# Patient Record
Sex: MALE | Race: WHITE | NOT HISPANIC OR LATINO | Employment: FULL TIME | URBAN - METROPOLITAN AREA
[De-identification: names, ages, dates, MRNs, and addresses within clinical notes are randomized per-mention and may not be internally consistent; named-entity substitution may affect disease eponyms.]

---

## 2017-03-09 RX ORDER — ATORVASTATIN CALCIUM 40 MG/1
TABLET, FILM COATED ORAL
Qty: 30 TABLET | Refills: 0 | Status: SHIPPED | OUTPATIENT
Start: 2017-03-09 | End: 2017-06-07 | Stop reason: SDUPTHER

## 2017-03-09 RX ORDER — ATENOLOL 25 MG/1
TABLET ORAL
Qty: 30 TABLET | Refills: 5 | Status: SHIPPED | OUTPATIENT
Start: 2017-03-09 | End: 2017-04-03 | Stop reason: SDUPTHER

## 2017-03-24 RX ORDER — LISINOPRIL 5 MG/1
5 TABLET ORAL DAILY
Qty: 90 TABLET | Refills: 0 | Status: SHIPPED | OUTPATIENT
Start: 2017-03-24 | End: 2017-06-23 | Stop reason: SDUPTHER

## 2017-03-29 RX ORDER — ISOSORBIDE MONONITRATE 30 MG/1
TABLET, EXTENDED RELEASE ORAL
Qty: 30 TABLET | Refills: 2 | Status: SHIPPED | OUTPATIENT
Start: 2017-03-29 | End: 2017-06-26 | Stop reason: SDUPTHER

## 2017-04-03 RX ORDER — ATENOLOL 25 MG/1
25 TABLET ORAL DAILY
Qty: 90 TABLET | Refills: 1 | Status: SHIPPED | OUTPATIENT
Start: 2017-04-03 | End: 2018-04-03 | Stop reason: SDUPTHER

## 2017-06-02 ENCOUNTER — TELEPHONE (OUTPATIENT)
Dept: CARDIOLOGY | Facility: CLINIC | Age: 57
End: 2017-06-02

## 2017-06-02 NOTE — TELEPHONE ENCOUNTER
I have called and left several message with PCP to fax over his lipids that has been done so we can refill his chol medications. I spoke to the pt girlfriend and she is going to call PCP to see if she can get a hold of them so they can fax over labs. After we obtain the labs we can then refill his atorvastatin.

## 2017-06-06 RX ORDER — ATORVASTATIN CALCIUM 40 MG/1
TABLET, FILM COATED ORAL
Qty: 30 TABLET | Refills: 0 | Status: CANCELLED | OUTPATIENT
Start: 2017-06-06

## 2017-06-06 NOTE — TELEPHONE ENCOUNTER
Obtain labs will show JA tomorrow when he returns. I will place his labs on your desk tomorrow to review. Is it okay to refill atorvastatin? Please Advise    Thanks Allie

## 2017-06-07 RX ORDER — ATORVASTATIN CALCIUM 40 MG/1
TABLET, FILM COATED ORAL
Qty: 30 TABLET | Refills: 10 | Status: SHIPPED | OUTPATIENT
Start: 2017-06-07 | End: 2018-05-12 | Stop reason: SDUPTHER

## 2017-06-21 ENCOUNTER — OFFICE VISIT (OUTPATIENT)
Dept: CARDIOLOGY | Facility: CLINIC | Age: 57
End: 2017-06-21

## 2017-06-21 VITALS
HEIGHT: 70 IN | HEART RATE: 55 BPM | BODY MASS INDEX: 36.22 KG/M2 | SYSTOLIC BLOOD PRESSURE: 126 MMHG | WEIGHT: 253 LBS | DIASTOLIC BLOOD PRESSURE: 80 MMHG

## 2017-06-21 DIAGNOSIS — I21.4 NSTEMI (NON-ST ELEVATED MYOCARDIAL INFARCTION) (HCC): ICD-10-CM

## 2017-06-21 DIAGNOSIS — Z72.0 TOBACCO ABUSE: ICD-10-CM

## 2017-06-21 DIAGNOSIS — I25.10 CORONARY ARTERY DISEASE INVOLVING NATIVE CORONARY ARTERY OF NATIVE HEART WITHOUT ANGINA PECTORIS: Primary | ICD-10-CM

## 2017-06-21 DIAGNOSIS — I10 ESSENTIAL HYPERTENSION: ICD-10-CM

## 2017-06-21 PROCEDURE — 93000 ELECTROCARDIOGRAM COMPLETE: CPT | Performed by: INTERNAL MEDICINE

## 2017-06-21 PROCEDURE — 99214 OFFICE O/P EST MOD 30 MIN: CPT | Performed by: INTERNAL MEDICINE

## 2017-06-21 NOTE — PROGRESS NOTES
Subjective:     Encounter Date:      Patient ID: Stephan Husain is a 56 y.o. male.    Chief Complaint:  History of Present Illness    Dear Dr. Taylor,    Had the pleasure of seeing this patient in the office today.  He comes in for routine follow-up of his cardiac status.  It is been one year since his last visit.    This patient has a history of a non-ST segment elevation myocardial infarction.  Prior cardiac catheterization demonstrated complete occlusion of a previously placed stent in his right coronary artery.  However he had significant collaterals that had developed.  His heart catheterization demonstrated inferior akinesis, ejection 45%, normal left main, 10% stenosis in the proximal LAD, circumflex widely patent, and the right coronary artery with a 90% mid and then 100% in-stent stenosis distal in the RCA.  Medical therapy was recommended.    His main complaint is that he is been very tired.  By the time he gets home from work he doesn't have any energy.  He has not been doing any exercise at all.  He's gained about 10 pounds since his visit here ago.    Patient unfortunately is not completely stopped smoking.  He continues to smoke one pack of cigarettes a day.  He would like to stop but has not been successful.    He denies any cardiac complaints.This patient denies any chest pain, pressure, tightness, squeezing, or heartburn.  This patient has not experienced any feeling of palpitations, tachycardia or heart racing and no presyncope or syncope.  There has not been any problems with dizziness or lightheadedness.  There has not been any orthopnea or PND, and no problems with lower extremity edema.  This patient denies any shortness of breath at rest or with activity and has not had any wheezing.  This patient has not had any problems with unexplained nausea or vomiting. The patient has continued to perform daily activities of living without any specific problem or change in the level of activity.   This patient has not been recently hospitalized for any reason.      The following portions of the patient's history were reviewed and updated as appropriate: allergies, current medications, past family history, past medical history, past social history, past surgical history and problem list.    Past Medical History:   Diagnosis Date   • CAD (coronary artery disease)    • Hypertension    • NSTEMI (non-ST elevated myocardial infarction)        Past Surgical History:   Procedure Laterality Date   • CARDIAC CATHETERIZATION     • CORONARY STENT PLACEMENT         Social History     Social History   • Marital status: Single     Spouse name: N/A   • Number of children: N/A   • Years of education: N/A     Occupational History   • Not on file.     Social History Main Topics   • Smoking status: Current Every Day Smoker   • Smokeless tobacco: Not on file   • Alcohol use Not on file   • Drug use: Not on file   • Sexual activity: Not on file     Other Topics Concern   • Not on file     Social History Narrative       Review of Systems   Constitution: Negative for chills, decreased appetite, fever and night sweats.   HENT: Negative for ear discharge, ear pain, hearing loss, nosebleeds and sore throat.    Eyes: Negative for blurred vision, double vision and pain.   Cardiovascular: Negative for cyanosis.   Respiratory: Negative for hemoptysis and sputum production.    Endocrine: Negative for cold intolerance and heat intolerance.   Hematologic/Lymphatic: Negative for adenopathy.   Skin: Negative for dry skin, itching, nail changes, rash and suspicious lesions.   Musculoskeletal: Negative for arthritis, gout, muscle cramps, muscle weakness, myalgias and neck pain.   Gastrointestinal: Negative for anorexia, bowel incontinence, constipation, diarrhea, dysphagia, hematemesis and jaundice.   Genitourinary: Negative for bladder incontinence, dysuria, flank pain, frequency, hematuria and nocturia.   Neurological: Negative for focal  "weakness, numbness, paresthesias and seizures.   Psychiatric/Behavioral: Negative for altered mental status, hallucinations, hypervigilance, suicidal ideas and thoughts of violence.   Allergic/Immunologic: Negative for persistent infections.         ECG 12 Lead  Date/Time: 6/21/2017 11:16 AM  Performed by: KENTRELL CHAVARRIA III.  Authorized by: KENTRELL CHAVARRIA III   Comparison: compared with previous ECG   Similar to previous ECG  Rhythm: sinus rhythm  Rate: normal  Conduction: conduction normal  ST Segments: ST segments normal  T Waves: T waves normal  QRS axis: normal  Other: no other findings  Q waves: II, III and aVF  Clinical impression: abnormal ECG               Objective:     Vitals:    06/21/17 0953   BP: 126/80   Pulse: 55   Weight: 253 lb (115 kg)   Height: 70\" (177.8 cm)         Physical Exam   Constitutional: He is oriented to person, place, and time. He appears well-developed and well-nourished. No distress.   HENT:   Head: Normocephalic and atraumatic.   Nose: Nose normal.   Mouth/Throat: Oropharynx is clear and moist.   Eyes: Conjunctivae and EOM are normal. Pupils are equal, round, and reactive to light. Right eye exhibits no discharge. Left eye exhibits no discharge.   Neck: Normal range of motion. Neck supple. No tracheal deviation present. No thyromegaly present.   Cardiovascular: Normal rate, regular rhythm, S1 normal, S2 normal, normal heart sounds and normal pulses.  Exam reveals no S3.    Pulmonary/Chest: Effort normal and breath sounds normal. No stridor. No respiratory distress. He exhibits no tenderness.   Abdominal: Soft. Bowel sounds are normal. He exhibits no distension and no mass. There is no tenderness. There is no rebound and no guarding.   Musculoskeletal: Normal range of motion. He exhibits no tenderness or deformity.   Lymphadenopathy:     He has no cervical adenopathy.   Neurological: He is alert and oriented to person, place, and time. He has normal reflexes.   Skin: Skin is warm " and dry. No rash noted. He is not diaphoretic. No erythema.   Psychiatric: He has a normal mood and affect. Thought content normal.       Lab Review:                 Assessment:          Diagnosis Plan   1. Coronary artery disease involving native coronary artery of native heart without angina pectoris  ECG 12 Lead   2. NSTEMI (non-ST elevated myocardial infarction)  ECG 12 Lead   3. Essential hypertension     4. Tobacco abuse            Plan:       1. Coronary Artery Disease  Assessment  • The patient has no angina    Plan  • Lifestyle modifications discussed include adhering to a heart healthy diet, avoidance of tobacco products, maintenance of a healthy weight, medication compliance, regular exercise and regular monitoring of cholesterol and blood pressure  • We specifically spent a lot of time going over exercise recommendations.  Additionally, reviewed strategies for smoking cessation.    Subjective - Objective  • There is a history of past MI  • There has been a previous stent procedure using MARCY  • Current antiplatelet therapy includes aspirin 81 mg    2.  Tobacco abuse-work on smoking cessation  3.  Hypertension-under good control    Thank you very much for allowing us to participate in the care of this pleasant patient.  Please don't hesitate to call if I can be of assistance in any way.      Current Outpatient Prescriptions:   •  aspirin tablet, Take 81 mg by mouth., Disp: , Rfl:   •  atenolol (TENORMIN) 25 MG tablet, Take 1 tablet by mouth Daily., Disp: 90 tablet, Rfl: 1  •  atorvastatin (LIPITOR) 40 MG tablet, Take 1 tablet at bedtime, Disp: 30 tablet, Rfl: 10  •  isosorbide mononitrate (IMDUR) 30 MG 24 hr tablet, TAKE 1 TABLET EVERY DAY, Disp: 30 tablet, Rfl: 2  •  lisinopril (PRINIVIL,ZESTRIL) 5 MG tablet, Take 1 tablet by mouth Daily., Disp: 90 tablet, Rfl: 0  •  Multiple Vitamin (MULTIVITAMIN) capsule, Take  by mouth., Disp: , Rfl:   •  Tiotropium Bromide Monohydrate (SPIRIVA HANDIHALER IN), Inhale.,  Disp: , Rfl:

## 2017-06-23 RX ORDER — LISINOPRIL 5 MG/1
TABLET ORAL
Qty: 90 TABLET | Refills: 2 | Status: SHIPPED | OUTPATIENT
Start: 2017-06-23 | End: 2018-05-02 | Stop reason: SDUPTHER

## 2017-06-26 RX ORDER — ISOSORBIDE MONONITRATE 30 MG/1
TABLET, EXTENDED RELEASE ORAL
Qty: 30 TABLET | Refills: 10 | Status: SHIPPED | OUTPATIENT
Start: 2017-06-26 | End: 2018-04-21 | Stop reason: SDUPTHER

## 2018-02-20 ENCOUNTER — TELEPHONE (OUTPATIENT)
Dept: CARDIOLOGY | Facility: CLINIC | Age: 58
End: 2018-02-20

## 2018-04-03 RX ORDER — ATENOLOL 25 MG/1
25 TABLET ORAL DAILY
Qty: 90 TABLET | Refills: 0 | Status: SHIPPED | OUTPATIENT
Start: 2018-04-03 | End: 2018-06-26 | Stop reason: SDUPTHER

## 2018-04-23 RX ORDER — ISOSORBIDE MONONITRATE 30 MG/1
30 TABLET, EXTENDED RELEASE ORAL DAILY
Qty: 30 TABLET | Refills: 1 | Status: SHIPPED | OUTPATIENT
Start: 2018-04-23 | End: 2018-06-26 | Stop reason: SDUPTHER

## 2018-05-02 RX ORDER — LISINOPRIL 5 MG/1
TABLET ORAL
Qty: 90 TABLET | Refills: 0 | Status: SHIPPED | OUTPATIENT
Start: 2018-05-02 | End: 2018-07-18 | Stop reason: SDUPTHER

## 2018-05-14 RX ORDER — ATORVASTATIN CALCIUM 40 MG/1
TABLET, FILM COATED ORAL
Qty: 30 TABLET | Refills: 10 | Status: SHIPPED | OUTPATIENT
Start: 2018-05-14 | End: 2019-02-14 | Stop reason: SDUPTHER

## 2018-05-14 NOTE — TELEPHONE ENCOUNTER
LMOM for pt to call w PCP name and if they had recent labs done.  [facility]

## 2018-05-14 NOTE — TELEPHONE ENCOUNTER
Rafael fournier we have scanned labs from 2/2017; I bet that he has labs more recently. Please check with his primary MD

## 2018-05-14 NOTE — TELEPHONE ENCOUNTER
I spoke to michelle and she will fax over his most recent lipids that was done to our office at [phone]. 1/2018

## 2018-06-26 RX ORDER — ISOSORBIDE MONONITRATE 30 MG/1
30 TABLET, EXTENDED RELEASE ORAL DAILY
Qty: 90 TABLET | Refills: 0 | Status: SHIPPED | OUTPATIENT
Start: 2018-06-26 | End: 2018-07-18 | Stop reason: SDUPTHER

## 2018-06-26 RX ORDER — ATENOLOL 25 MG/1
25 TABLET ORAL DAILY
Qty: 90 TABLET | Refills: 0 | Status: SHIPPED | OUTPATIENT
Start: 2018-06-26 | End: 2018-09-22 | Stop reason: SDUPTHER

## 2018-07-05 RX ORDER — ISOSORBIDE MONONITRATE 30 MG/1
TABLET, EXTENDED RELEASE ORAL
Qty: 90 TABLET | Refills: 0 | OUTPATIENT
Start: 2018-07-05

## 2018-07-05 RX ORDER — LISINOPRIL 5 MG/1
TABLET ORAL
Qty: 90 TABLET | Refills: 0 | OUTPATIENT
Start: 2018-07-05

## 2018-07-18 RX ORDER — ISOSORBIDE MONONITRATE 30 MG/1
30 TABLET, EXTENDED RELEASE ORAL DAILY
Qty: 90 TABLET | Refills: 0 | Status: SHIPPED | OUTPATIENT
Start: 2018-07-18 | End: 2018-12-04 | Stop reason: SDUPTHER

## 2018-07-18 RX ORDER — LISINOPRIL 5 MG/1
5 TABLET ORAL DAILY
Qty: 90 TABLET | Refills: 0 | Status: SHIPPED | OUTPATIENT
Start: 2018-07-18 | End: 2018-10-24 | Stop reason: SDUPTHER

## 2018-09-24 RX ORDER — ATENOLOL 25 MG/1
25 TABLET ORAL DAILY
Qty: 90 TABLET | Refills: 1 | Status: SHIPPED | OUTPATIENT
Start: 2018-09-24 | End: 2019-02-22 | Stop reason: SDUPTHER

## 2018-10-04 ENCOUNTER — OFFICE VISIT (OUTPATIENT)
Dept: CARDIOLOGY | Facility: CLINIC | Age: 58
End: 2018-10-04

## 2018-10-04 VITALS
BODY MASS INDEX: 34.86 KG/M2 | HEART RATE: 70 BPM | WEIGHT: 249 LBS | HEIGHT: 71 IN | DIASTOLIC BLOOD PRESSURE: 76 MMHG | SYSTOLIC BLOOD PRESSURE: 120 MMHG

## 2018-10-04 DIAGNOSIS — Z72.0 TOBACCO ABUSE: ICD-10-CM

## 2018-10-04 DIAGNOSIS — G47.33 OSA (OBSTRUCTIVE SLEEP APNEA): ICD-10-CM

## 2018-10-04 DIAGNOSIS — I10 ESSENTIAL HYPERTENSION: ICD-10-CM

## 2018-10-04 DIAGNOSIS — I21.4 NSTEMI (NON-ST ELEVATED MYOCARDIAL INFARCTION) (HCC): ICD-10-CM

## 2018-10-04 DIAGNOSIS — I25.10 CORONARY ARTERY DISEASE INVOLVING NATIVE CORONARY ARTERY OF NATIVE HEART WITHOUT ANGINA PECTORIS: Primary | ICD-10-CM

## 2018-10-04 PROCEDURE — 99214 OFFICE O/P EST MOD 30 MIN: CPT | Performed by: INTERNAL MEDICINE

## 2018-10-04 PROCEDURE — 93000 ELECTROCARDIOGRAM COMPLETE: CPT | Performed by: INTERNAL MEDICINE

## 2018-10-04 NOTE — PROGRESS NOTES
Subjective:     Encounter Date: 10/4/2018      Patient ID: Stephan Husain is a 58 y.o. male.    Chief Complaint: CAD  History of Present Illness      Had the pleasure of seeing this patient in the office today.  He comes in for routine follow-up of his cardiac status.  It is been one year since his last visit.    This patient has a history of a non-ST segment elevation myocardial infarction.  Prior cardiac catheterization demonstrated complete occlusion of a previously placed stent in his right coronary artery.  However he had significant collaterals that had developed.  His heart catheterization demonstrated inferior akinesis, ejection 45%, normal left main, 10% stenosis in the proximal LAD, circumflex widely patent, and the right coronary artery with a 90% mid and then 100% in-stent stenosis distal in the RCA.  Medical therapy was recommended.    He is been very tired and very fatigued.  His wife says that now he seems to stop breathing sometimes at night and he will wake up and feel like he's having to work harder to breathe.  No orthopnea.  No lower extremity edema.  He has not had any chest pain or chest discomfort.  Weight is not really altered over the last year.  He denies any palpitations or heart racing.  No presyncope or syncope.  He's had no other significant medical illnesses.      The following portions of the patient's history were reviewed and updated as appropriate: allergies, current medications, past family history, past medical history, past social history, past surgical history and problem list.    Past Medical History:   Diagnosis Date   • CAD (coronary artery disease)    • Hypertension    • NSTEMI (non-ST elevated myocardial infarction) (CMS/[facility])        Past Surgical History:   Procedure Laterality Date   • CARDIAC CATHETERIZATION     • CORONARY STENT PLACEMENT         Social History     Social History   • Marital status: Single     Spouse name: N/A   • Number of children: N/A   • Years  of education: N/A     Occupational History   • Not on file.     Social History Main Topics   • Smoking status: Current Every Day Smoker   • Smokeless tobacco: Not on file   • Alcohol use Yes      Comment: OCC   • Drug use: Unknown   • Sexual activity: Not on file     Other Topics Concern   • Not on file     Social History Narrative   • No narrative on file       Review of Systems   Constitution: Negative for chills, decreased appetite, fever and night sweats.   HENT: Negative for ear discharge, ear pain, hearing loss, nosebleeds and sore throat.    Eyes: Negative for blurred vision, double vision and pain.   Cardiovascular: Negative for cyanosis.   Respiratory: Negative for hemoptysis and sputum production.    Endocrine: Negative for cold intolerance and heat intolerance.   Hematologic/Lymphatic: Negative for adenopathy.   Skin: Negative for dry skin, itching, nail changes, rash and suspicious lesions.   Musculoskeletal: Negative for arthritis, gout, muscle cramps, muscle weakness, myalgias and neck pain.   Gastrointestinal: Negative for anorexia, bowel incontinence, constipation, diarrhea, dysphagia, hematemesis and jaundice.   Genitourinary: Negative for bladder incontinence, dysuria, flank pain, frequency, hematuria and nocturia.   Neurological: Negative for focal weakness, numbness, paresthesias and seizures.   Psychiatric/Behavioral: Negative for altered mental status, hallucinations, hypervigilance, suicidal ideas and thoughts of violence.   Allergic/Immunologic: Negative for persistent infections.         ECG 12 Lead  Date/Time: 10/4/2018 4:42 PM  Performed by: KENTRELL CHAVARRIA III.  Authorized by: KENTRELL CHAVARRIA III   Comparison: compared with previous ECG   Similar to previous ECG  Rhythm: sinus rhythm  Rate: normal  Conduction: conduction normal  ST Segments: ST segments normal  T Waves: T waves normal  QRS axis: normal  Other: no other findings  Q waves: V4, V5, V6, III and aVF  Clinical impression:  "abnormal ECG               Objective:     Vitals:    10/04/18 1341   BP: 120/76   Pulse: 70   Weight: 113 kg (249 lb)   Height: 180.3 cm (71\")         Physical Exam   Constitutional: He is oriented to person, place, and time. He appears well-developed and well-nourished. No distress.   HENT:   Head: Normocephalic and atraumatic.   Nose: Nose normal.   Mouth/Throat: Oropharynx is clear and moist.   Eyes: Pupils are equal, round, and reactive to light. Conjunctivae and EOM are normal. Right eye exhibits no discharge. Left eye exhibits no discharge.   Neck: Normal range of motion. Neck supple. No tracheal deviation present. No thyromegaly present.   Cardiovascular: Normal rate, regular rhythm, S1 normal, S2 normal, normal heart sounds and normal pulses.  Exam reveals no S3.    Pulmonary/Chest: Effort normal and breath sounds normal. No stridor. No respiratory distress. He exhibits no tenderness.   Abdominal: Soft. Bowel sounds are normal. He exhibits no distension and no mass. There is no tenderness. There is no rebound and no guarding.   Musculoskeletal: Normal range of motion. He exhibits no tenderness or deformity.   Lymphadenopathy:     He has no cervical adenopathy.   Neurological: He is alert and oriented to person, place, and time. He has normal reflexes.   Skin: Skin is warm and dry. No rash noted. He is not diaphoretic. No erythema.   Psychiatric: He has a normal mood and affect. Thought content normal.       Lab Review:                 Assessment:          Diagnosis Plan   1. Coronary artery disease involving native coronary artery of native heart without angina pectoris  Ambulatory Referral to Sleep Lab    ECG 12 Lead   2. Essential hypertension  ECG 12 Lead   3. NSTEMI (non-ST elevated myocardial infarction) (CMS/[facility])  ECG 12 Lead   4. Tobacco abuse     5. SESAR (obstructive sleep apnea)  Ambulatory Referral to Sleep Lab          Plan:       1. Coronary Artery Disease  Assessment  • The patient has no " angina    Plan  • Lifestyle modifications discussed include adhering to a heart healthy diet, avoidance of tobacco products, maintenance of a healthy weight, medication compliance, regular exercise and regular monitoring of cholesterol and blood pressure  • We specifically spent a lot of time going over exercise recommendations.  Additionally, reviewed strategies for smoking cessation.    Subjective - Objective  • There is a history of past MI  • There has been a previous stent procedure using MARCY  • Current antiplatelet therapy includes aspirin 81 mg    2.  Tobacco abuse-work on smoking cessation  3.  Hypertension-under good control on 4.  I'm concerned about potential sleep apnea, we will arrange for a sleep study to be performed    Thank you very much for allowing us to participate in the care of this pleasant patient.  Please don't hesitate to call if I can be of assistance in any way.      Current Outpatient Prescriptions:   •  aspirin tablet, Take 81 mg by mouth., Disp: , Rfl:   •  atenolol (TENORMIN) 25 MG tablet, TAKE 1 TABLET BY MOUTH DAILY., Disp: 90 tablet, Rfl: 1  •  atorvastatin (LIPITOR) 40 MG tablet, TAKE 1 TABLET AT BEDTIME, Disp: 30 tablet, Rfl: 10  •  isosorbide mononitrate (IMDUR) 30 MG 24 hr tablet, Take 1 tablet by mouth Daily., Disp: 90 tablet, Rfl: 0  •  lisinopril (PRINIVIL,ZESTRIL) 5 MG tablet, Take 1 tablet by mouth Daily., Disp: 90 tablet, Rfl: 0  •  metFORMIN (GLUCOPHAGE) 500 MG tablet, Take 500 mg by mouth Daily. TAKE 1/2 TABLET DAILY, Disp: , Rfl:   •  Misc Natural Products (TURMERIC CURCUMIN) capsule, Take  by mouth., Disp: , Rfl:   •  Multiple Vitamin (MULTIVITAMIN) capsule, Take  by mouth., Disp: , Rfl:   •  Tiotropium Bromide Monohydrate (SPIRIVA HANDIHALER IN), Inhale., Disp: , Rfl:

## 2018-10-24 RX ORDER — LISINOPRIL 5 MG/1
TABLET ORAL
Qty: 90 TABLET | Refills: 3 | Status: SHIPPED | OUTPATIENT
Start: 2018-10-24 | End: 2019-10-15 | Stop reason: SDUPTHER

## 2018-11-06 ENCOUNTER — APPOINTMENT (OUTPATIENT)
Dept: SLEEP MEDICINE | Facility: HOSPITAL | Age: 58
End: 2018-11-06
Attending: INTERNAL MEDICINE

## 2018-12-04 RX ORDER — ISOSORBIDE MONONITRATE 30 MG/1
TABLET, EXTENDED RELEASE ORAL
Qty: 90 TABLET | Refills: 2 | Status: SHIPPED | OUTPATIENT
Start: 2018-12-04 | End: 2019-09-18 | Stop reason: SDUPTHER

## 2019-02-14 DIAGNOSIS — E78.2 MIXED HYPERLIPIDEMIA: Primary | ICD-10-CM

## 2019-02-14 RX ORDER — ATORVASTATIN CALCIUM 40 MG/1
40 TABLET, FILM COATED ORAL
Qty: 30 TABLET | Refills: 0 | Status: SHIPPED | OUTPATIENT
Start: 2019-02-14 | End: 2019-04-03 | Stop reason: SDUPTHER

## 2019-02-19 ENCOUNTER — LAB (OUTPATIENT)
Dept: LAB | Facility: HOSPITAL | Age: 59
End: 2019-02-19

## 2019-02-19 DIAGNOSIS — E78.2 MIXED HYPERLIPIDEMIA: ICD-10-CM

## 2019-02-19 LAB
ALBUMIN SERPL-MCNC: 4.3 G/DL (ref 3.5–5.2)
ALBUMIN/GLOB SERPL: 1.2 G/DL
ALP SERPL-CCNC: 152 U/L (ref 40–129)
ALT SERPL W P-5'-P-CCNC: 44 U/L (ref 5–41)
ANION GAP SERPL CALCULATED.3IONS-SCNC: 8.2 MMOL/L
AST SERPL-CCNC: 26 U/L (ref 5–40)
BILIRUB SERPL-MCNC: 0.4 MG/DL (ref 0.2–1.2)
BUN BLD-MCNC: 12 MG/DL (ref 6–20)
BUN/CREAT SERPL: 13 (ref 7–25)
CALCIUM SPEC-SCNC: 9.2 MG/DL (ref 8.6–10.5)
CHLORIDE SERPL-SCNC: 102 MMOL/L (ref 98–107)
CHOLEST SERPL-MCNC: 132 MG/DL (ref 0–200)
CO2 SERPL-SCNC: 30.8 MMOL/L (ref 22–29)
CREAT BLD-MCNC: 0.92 MG/DL (ref 0.76–1.27)
GFR SERPL CREATININE-BSD FRML MDRD: 85 ML/MIN/1.73
GLOBULIN UR ELPH-MCNC: 3.7 GM/DL
GLUCOSE BLD-MCNC: 105 MG/DL (ref 65–99)
HDLC SERPL-MCNC: 40 MG/DL (ref 40–60)
LDLC SERPL CALC-MCNC: 63 MG/DL (ref 0–100)
LDLC/HDLC SERPL: 1.58 {RATIO}
POTASSIUM BLD-SCNC: 4.2 MMOL/L (ref 3.5–5.2)
PROT SERPL-MCNC: 8 G/DL (ref 6–8.5)
SODIUM BLD-SCNC: 141 MMOL/L (ref 136–145)
TRIGL SERPL-MCNC: 144 MG/DL (ref 0–150)
VLDLC SERPL-MCNC: 28.8 MG/DL (ref 8–32)

## 2019-02-19 PROCEDURE — 80053 COMPREHEN METABOLIC PANEL: CPT

## 2019-02-19 PROCEDURE — 36415 COLL VENOUS BLD VENIPUNCTURE: CPT

## 2019-02-19 PROCEDURE — 80061 LIPID PANEL: CPT

## 2019-02-22 RX ORDER — ATENOLOL 25 MG/1
25 TABLET ORAL DAILY
Qty: 90 TABLET | Refills: 1 | Status: SHIPPED | OUTPATIENT
Start: 2019-02-22 | End: 2019-08-28 | Stop reason: SDUPTHER

## 2019-04-03 RX ORDER — ATORVASTATIN CALCIUM 40 MG/1
40 TABLET, FILM COATED ORAL
Qty: 30 TABLET | Refills: 11 | Status: SHIPPED | OUTPATIENT
Start: 2019-04-03 | End: 2020-03-30

## 2019-05-06 ENCOUNTER — OFFICE VISIT (OUTPATIENT)
Dept: CARDIOLOGY | Facility: CLINIC | Age: 59
End: 2019-05-06

## 2019-05-06 VITALS
HEIGHT: 71 IN | HEART RATE: 92 BPM | BODY MASS INDEX: 35.43 KG/M2 | DIASTOLIC BLOOD PRESSURE: 82 MMHG | WEIGHT: 253.1 LBS | OXYGEN SATURATION: 95 % | SYSTOLIC BLOOD PRESSURE: 148 MMHG

## 2019-05-06 DIAGNOSIS — I25.10 CORONARY ARTERY DISEASE INVOLVING NATIVE CORONARY ARTERY OF NATIVE HEART WITHOUT ANGINA PECTORIS: Primary | ICD-10-CM

## 2019-05-06 DIAGNOSIS — Z72.0 TOBACCO ABUSE: ICD-10-CM

## 2019-05-06 DIAGNOSIS — I10 ESSENTIAL HYPERTENSION: ICD-10-CM

## 2019-05-06 DIAGNOSIS — I21.4 NSTEMI (NON-ST ELEVATED MYOCARDIAL INFARCTION) (HCC): ICD-10-CM

## 2019-05-06 PROCEDURE — 93000 ELECTROCARDIOGRAM COMPLETE: CPT | Performed by: NURSE PRACTITIONER

## 2019-05-06 PROCEDURE — 99214 OFFICE O/P EST MOD 30 MIN: CPT | Performed by: NURSE PRACTITIONER

## 2019-05-06 NOTE — PROGRESS NOTES
"    Subjective:     Encounter Date:05/06/2019      Patient ID: Stephan Husain is a 58 y.o. male.    Chief Complaint: CAD  History of Present Illness     He is a new patient to me and I have reviewed his past medical records.  He is a patient of Dr. Spring.    He has a history of a non-ST segment elevation myocardial infarction.  Prior cardiac catheterization demonstrated a complete occlusion of the previously placed stent in his right coronary artery.  However he had significant collaterals that had developed.  His heart catheterization demonstrated inferior akinesis, ejection fraction 45%, normal left main, 10% stenosis in the proximal LAD, circumflex widely patent, and the right coronary artery with a 90% mid and then 100% in-stent stenosis distal to the RCA.  Medical therapy was recommended    He was last seen in October 2018.  He complained of being very tired and fatigued.  His wife stated that he seemed to stop breathing sometimes at night and will wake up and feel like he is having to work harder to breathe.  He was sent for a sleep apnea evaluation.    He presents today, 5/6/19, in follow-up of his cardiac status.  He is switching jobs and losing his insurance as of Thursday and wanted to be seen before that happens.  He denies any palpitations, shortness of air, edema.  He denies any chest pain or chest tightness.  He has had no episodes of lightheadedness or dizziness.  He is trying to stop smoking and has cut back.  He states he has a good support system with his girlfriend.  He complains of fatigue still.  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  He did not follow-up with his sleep study.  He states \"I am just going to deal with it\".      The following portions of the patient's history were reviewed and updated as appropriate: allergies, current medications, past family history, past medical history, past social history, past surgical history and problem " list.    Review of Systems   Constitution: Negative for weakness and malaise/fatigue.   HENT: Negative for congestion, hoarse voice and sore throat.    Eyes: Negative for blurred vision, double vision, photophobia, vision loss in left eye and vision loss in right eye.   Cardiovascular: Negative for chest pain, dyspnea on exertion, irregular heartbeat, leg swelling, near-syncope, orthopnea, palpitations, paroxysmal nocturnal dyspnea and syncope.   Respiratory: Negative for cough, hemoptysis, shortness of breath, sleep disturbances due to breathing, snoring, sputum production and wheezing.    Endocrine: Negative.    Hematologic/Lymphatic: Does not bruise/bleed easily.   Skin: Negative for color change, dry skin, poor wound healing and rash.   Musculoskeletal: Negative for back pain, falls, gout, joint pain, joint swelling, muscle cramps and muscle weakness.   Gastrointestinal: Negative for abdominal pain, constipation, diarrhea, dysphagia, melena, nausea and vomiting.   Neurological: Negative for excessive daytime sleepiness, dizziness, headaches, light-headedness, loss of balance, numbness, paresthesias, seizures and vertigo.   Psychiatric/Behavioral: Negative for depression and substance abuse. The patient is not nervous/anxious.        ECG 12 Lead  Date/Time: 5/6/2019 9:11 AM  Performed by: Susie Whiting APRN  Authorized by: Susie Whiting APRN   Comparison: compared with previous ECG from 10/4/2018  Similar to previous ECG  Rhythm: sinus rhythm  Rate: normal  Conduction: non-specific intraventricular conduction delay  Q waves: aVL, V3 and V4    ST Segments: ST segments normal  T inversion: III and aVF  T flattening: II  QRS axis: borderline left axis     Clinical impression: abnormal EKG               Objective:         Physical Exam   Constitutional: He is oriented to person, place, and time. Vital signs are normal. He appears well-developed and well-nourished. No distress.   HENT:   Head: Normocephalic and  "atraumatic.   Right Ear: Hearing normal.   Left Ear: Hearing normal.   Eyes: Conjunctivae and lids are normal.   Neck: Normal range of motion. Neck supple. No JVD present. Carotid bruit is not present. No thyromegaly present.   Cardiovascular: Normal rate, regular rhythm, S1 normal, S2 normal, normal heart sounds and intact distal pulses.  PMI is not displaced.  Exam reveals no gallop.    No murmur heard.  Pulses:       Carotid pulses are 2+ on the right side, and 2+ on the left side.       Radial pulses are 2+ on the right side, and 2+ on the left side.        Dorsalis pedis pulses are 2+ on the right side, and 2+ on the left side.        Posterior tibial pulses are 2+ on the right side, and 2+ on the left side.   Pulmonary/Chest: Effort normal and breath sounds normal. No respiratory distress. Wheezes right upper and lower lobes.  He has no rhonchi. He has no rales. He exhibits no tenderness.   Abdominal: Soft. Normal appearance and bowel sounds are normal. He exhibits no distension, no abdominal bruit and no mass. There is no tenderness.   Musculoskeletal: Normal range of motion.   Exhibits no edema or deformity   Lymphadenopathy:     He has no cervical adenopathy.   Neurological: He is alert and oriented to person, place, and time. No cranial nerve deficit. Coordination and gait normal.   Oriented to person, place and time.   Skin: Skin is warm, dry and intact. No rash noted. He is not diaphoretic. No cyanosis. Nails show no clubbing.   Psychiatric: He has a normal mood and affect. His speech is normal and behavior is normal. Judgment and thought content normal. Cognition and memory are normal.     Vitals:    05/06/19 0842   BP: 148/82   BP Location: Right arm   Patient Position: Sitting   Cuff Size: Adult   Pulse: 92   SpO2: 95%   Weight: 115 kg (253 lb 1.6 oz)   Height: 180.3 cm (71\")           Lab Review:       Assessment:          Diagnosis Plan   1. Coronary artery disease involving native coronary artery " of native heart without angina pectoris     2. NSTEMI (non-ST elevated myocardial infarction) (CMS/HCC)     3. Essential hypertension     4. Tobacco abuse            Plan:       1/2.  CAD/NSTEMI - Denies angina  Coronary Artery Disease  Assessment  • The patient has no angina    Plan  • Lifestyle modifications discussed include adhering to a heart healthy diet, avoidance of tobacco products, maintenance of a healthy weight, medication compliance, regular exercise and regular monitoring of cholesterol and blood pressure    Subjective - Objective  • There is a history of past MI  • There has been a previous stent procedure using MARCY  • Current antiplatelet therapy includes aspirin 81 mg    3.  Essential HTN - elevated at visit today.  He is just coming off night shift.     4.  Tobacco abuse - I advised Stephan of the risks of continuing to use tobacco.  During this visit, I spent 5 minutes counseling the patient regarding tobacco cessation.    RTO 1 year with NICOLAS Saxena      Current Outpatient Medications:   •  aspirin tablet, Take 81 mg by mouth., Disp: , Rfl:   •  atenolol (TENORMIN) 25 MG tablet, Take 1 tablet by mouth Daily., Disp: 90 tablet, Rfl: 1  •  atorvastatin (LIPITOR) 40 MG tablet, Take 1 tablet by mouth every night at bedtime., Disp: 30 tablet, Rfl: 11  •  isosorbide mononitrate (IMDUR) 30 MG 24 hr tablet, TAKE 1 TABLET BY MOUTH EVERY DAY, Disp: 90 tablet, Rfl: 2  •  lisinopril (PRINIVIL,ZESTRIL) 5 MG tablet, TAKE 1 TABLET BY MOUTH EVERY DAY, Disp: 90 tablet, Rfl: 3  •  metFORMIN (GLUCOPHAGE) 500 MG tablet, Take 500 mg by mouth Daily. TAKE 1/2 TABLET DAILY, Disp: , Rfl:   •  Misc Natural Products (TURMERIC CURCUMIN) capsule, Take  by mouth., Disp: , Rfl:   •  Multiple Vitamin (MULTIVITAMIN) capsule, Take  by mouth., Disp: , Rfl:   •  Tiotropium Bromide Monohydrate (SPIRIVA HANDIHALER IN), Inhale., Disp: , Rfl:

## 2019-08-28 RX ORDER — ATENOLOL 25 MG/1
TABLET ORAL
Qty: 90 TABLET | Refills: 1 | Status: SHIPPED | OUTPATIENT
Start: 2019-08-28 | End: 2020-02-21

## 2019-09-18 RX ORDER — ISOSORBIDE MONONITRATE 30 MG/1
30 TABLET, EXTENDED RELEASE ORAL DAILY
Qty: 90 TABLET | Refills: 2 | Status: SHIPPED | OUTPATIENT
Start: 2019-09-18 | End: 2020-06-15

## 2019-10-15 RX ORDER — LISINOPRIL 5 MG/1
TABLET ORAL
Qty: 90 TABLET | Refills: 3 | Status: SHIPPED | OUTPATIENT
Start: 2019-10-15 | End: 2020-10-05

## 2020-02-21 RX ORDER — ATENOLOL 25 MG/1
TABLET ORAL
Qty: 90 TABLET | Refills: 1 | Status: SHIPPED | OUTPATIENT
Start: 2020-02-21

## 2020-03-30 RX ORDER — ATORVASTATIN CALCIUM 40 MG/1
TABLET, FILM COATED ORAL
Qty: 90 TABLET | Refills: 0 | Status: SHIPPED | OUTPATIENT
Start: 2020-03-30 | End: 2020-06-22

## 2020-04-13 ENCOUNTER — TELEPHONE (OUTPATIENT)
Dept: CARDIOLOGY | Facility: CLINIC | Age: 60
End: 2020-04-13

## 2020-04-13 NOTE — TELEPHONE ENCOUNTER
Pt will call back to make his appointment. He is going to check to see if our office accepts his insurance.     Thanks Allie

## 2020-06-15 RX ORDER — ISOSORBIDE MONONITRATE 30 MG/1
30 TABLET, EXTENDED RELEASE ORAL DAILY
Qty: 30 TABLET | Refills: 0 | Status: SHIPPED | OUTPATIENT
Start: 2020-06-15 | End: 2020-07-10

## 2020-06-17 NOTE — TELEPHONE ENCOUNTER
Allie   Pt is going to check with his insurance to see if we take or do not take his insurance. She will have to call back to schedule.  Thanks,  Mason

## 2020-06-21 ENCOUNTER — TELEPHONE (OUTPATIENT)
Dept: CARDIOLOGY | Facility: CLINIC | Age: 60
End: 2020-06-21

## 2020-06-22 RX ORDER — ATORVASTATIN CALCIUM 40 MG/1
40 TABLET, FILM COATED ORAL
Qty: 30 TABLET | Refills: 0 | Status: SHIPPED | OUTPATIENT
Start: 2020-06-22 | End: 2020-07-20

## 2020-06-22 NOTE — TELEPHONE ENCOUNTER
Allie   Pt left a voicemail stating there is still issues pertaining to pt's insurance.  #[phone]  Thanks,  Mason

## 2020-07-10 RX ORDER — ISOSORBIDE MONONITRATE 30 MG/1
30 TABLET, EXTENDED RELEASE ORAL DAILY
Qty: 10 TABLET | Refills: 0 | Status: SHIPPED | OUTPATIENT
Start: 2020-07-10

## 2020-07-20 RX ORDER — ATORVASTATIN CALCIUM 40 MG/1
TABLET, FILM COATED ORAL
Qty: 30 TABLET | Refills: 2 | Status: SHIPPED | OUTPATIENT
Start: 2020-07-20 | End: 2020-10-14

## 2020-08-13 ENCOUNTER — TELEPHONE (OUTPATIENT)
Dept: CARDIOLOGY | Facility: CLINIC | Age: 60
End: 2020-08-13

## 2020-08-13 NOTE — TELEPHONE ENCOUNTER
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  Are you okay with him switching to Dr Mcneill?    Thank you  Nesha NAVA

## 2020-08-13 NOTE — TELEPHONE ENCOUNTER
Dr Mcneill    Are you willing to take over the care for this patient?  She has [place] Medicaid and can only be seen in [place].    Thank you  Nesha NAVA

## 2020-09-29 PROBLEM — R05.9 COUGH: Status: ACTIVE | Noted: 2020-09-29

## 2020-09-29 PROBLEM — J44.9 CHRONIC OBSTRUCTIVE LUNG DISEASE: Status: ACTIVE | Noted: 2018-01-24

## 2020-09-29 PROBLEM — L57.0 ACTINIC KERATOSIS: Status: ACTIVE | Noted: 2018-01-24

## 2020-09-29 PROBLEM — D12.6 TUBULAR ADENOMA OF COLON: Status: ACTIVE | Noted: 2018-03-02

## 2020-09-29 PROBLEM — J40 BRONCHITIS: Status: ACTIVE | Noted: 2020-09-29

## 2020-09-29 PROBLEM — E83.19 IRON OVERLOAD: Status: ACTIVE | Noted: 2018-01-24

## 2020-09-29 PROBLEM — K64.8 INTERNAL HEMORRHOIDS: Status: ACTIVE | Noted: 2018-03-02

## 2020-09-29 PROBLEM — R07.81 RIB PAIN: Status: ACTIVE | Noted: 2020-09-29

## 2020-09-29 PROBLEM — K57.90 DIVERTICULAR DISEASE: Status: ACTIVE | Noted: 2018-03-02

## 2020-09-29 PROBLEM — R06.2 WHEEZING: Status: ACTIVE | Noted: 2020-09-29

## 2020-10-01 ENCOUNTER — OFFICE VISIT (OUTPATIENT)
Dept: CARDIOLOGY | Facility: CLINIC | Age: 60
End: 2020-10-01

## 2020-10-01 VITALS
SYSTOLIC BLOOD PRESSURE: 126 MMHG | RESPIRATION RATE: 18 BRPM | WEIGHT: 268 LBS | DIASTOLIC BLOOD PRESSURE: 84 MMHG | BODY MASS INDEX: 37.52 KG/M2 | HEIGHT: 71 IN | HEART RATE: 78 BPM

## 2020-10-01 DIAGNOSIS — R06.09 EXERTIONAL DYSPNEA: ICD-10-CM

## 2020-10-01 DIAGNOSIS — I73.9 PVD (PERIPHERAL VASCULAR DISEASE) WITH CLAUDICATION (HCC): ICD-10-CM

## 2020-10-01 DIAGNOSIS — I25.10 CORONARY ARTERY DISEASE INVOLVING NATIVE CORONARY ARTERY OF NATIVE HEART WITHOUT ANGINA PECTORIS: Primary | ICD-10-CM

## 2020-10-01 DIAGNOSIS — J43.8 OTHER EMPHYSEMA (HCC): ICD-10-CM

## 2020-10-01 PROCEDURE — 99214 OFFICE O/P EST MOD 30 MIN: CPT | Performed by: INTERNAL MEDICINE

## 2020-10-01 PROCEDURE — 93000 ELECTROCARDIOGRAM COMPLETE: CPT | Performed by: INTERNAL MEDICINE

## 2020-10-01 NOTE — PROGRESS NOTES
[facility]      Patient Name: Stephan Husain  :1960  Age: 60 y.o.  Encounter Provider:  Donovan Mcneill Jr, MD      Chief Complaint:   Chief Complaint   Patient presents with   • Coronary Artery Disease         HPI  Stephan Husain is a 60 y.o. male with past medical history of coronary artery disease status post MI in , diabetes and tobacco abuse who presents for follow-up of chronic medical illness.  The patient previously followed with Dr. Spring but cannot be seen in [place] any longer due to insurance issues.  He has been out of work for the last year.  He notes that he performs less exercise due to posterior leg pain.  He had to stop using the push mower as when he walks more than 50 feet he has bilateral hip and calf pain.  He notes increased dyspnea on exertion and lower extremity edema.  He denies any chest pain or rest dyspnea.  He denies orthopnea but does note that he wakes up several times per night and does snore.  He continues to smoke at least 1 pack/day.  He denies alcohol or illicit drug use.  Family history is reviewed is not pertinent to this clinic.  Coronary artery disease as noted with myocardial infarction .  He also experienced non-STEMI about 5 years ago for which cardiac catheterization demonstrated 100% in-stent stenosis of previous RCA stenting with adequate collateral flow to the distal vessel and otherwise nonobstructive disease.      The following portions of the patient's history were reviewed and updated as appropriate: allergies, current medications, past family history, past medical history, past social history, past surgical history and problem list.      Review of Systems   Constitution: Negative for chills and fever.   HENT: Negative for hoarse voice and sore throat.    Eyes: Negative for double vision and photophobia.   Cardiovascular: Positive for claudication, dyspnea on exertion and leg swelling. Negative for chest pain, near-syncope,  "orthopnea, palpitations, paroxysmal nocturnal dyspnea and syncope.   Respiratory: Negative for cough and wheezing.    Skin: Negative for poor wound healing and rash.   Musculoskeletal: Negative for arthritis and joint swelling.   Gastrointestinal: Negative for bloating, abdominal pain, hematemesis and hematochezia.   Neurological: Negative for dizziness and focal weakness.   Psychiatric/Behavioral: Negative for depression and suicidal ideas.       OBJECTIVE:   Vital Signs  Vitals:    10/01/20 1331   BP: 126/84   Pulse: 78   Resp: 18     Estimated body mass index is 37.38 kg/m² as calculated from the following:    Height as of this encounter: 180.3 cm (71\").    Weight as of this encounter: 122 kg (268 lb).    Vitals signs reviewed.   Constitutional:       Appearance: Healthy appearance. Not in distress. Chronically ill-appearing.   Neck:      Vascular: No JVR. JVD normal.   Pulmonary:      Breath sounds: Wheezing present. No rales.      Comments: Very poor air entry with diffuse wheezing in all lung fields  Chest:      Chest wall: Not tender to palpatation.   Cardiovascular:      PMI at left midclavicular line. Normal rate. Regular rhythm.      Murmurs: There is no murmur.      No gallop. No click. No rub.   Pulses:     Decreased pulses.   Edema:     Peripheral edema present.     Thigh: bilateral 1+ edema of the thigh.     Pretibial: bilateral 1+ edema of the pretibial area.     Ankle: bilateral 1+ edema of the ankle.     Feet: bilateral 1+ edema of the feet.  Abdominal:      General: Bowel sounds are normal.      Palpations: Abdomen is soft.      Tenderness: There is no abdominal tenderness.   Musculoskeletal: Normal range of motion.         General: No tenderness.   Skin:     General: Skin is warm.      Coloration: Skin is plethoric.   Neurological:      General: No focal deficit present.      Mental Status: Alert and oriented to person, place and time.           ECG 12 Lead    Date/Time: 10/1/2020 2:36 " PM  Performed by: Donovan Mcneill Jr., MD  Authorized by: Donovan Mcneill Jr., MD   Comparison: compared with previous ECG from 5/6/2019  Similar to previous ECG  Rhythm: sinus rhythm  Conduction: non-specific intraventricular conduction delay  Q waves: V1, V2, V3, V4, V5, V6, II, III and aVF      Clinical impression: abnormal EKG and myocardial infarction                  ASSESSMENT:     Coronary artery disease status post myocardial infarction   claudication  Hypertension  Diabetes  COPD  Tobacco abuse    PLAN OF CARE:     1. CAD -no angina.  Continue aspirin, beta-blocker and statin.  Check lipid profile.  Continue long-acting nitrate and lisinopril.  Counseled on absolute need for tobacco cessation.  2. Claudication -ABIs.  This will most certainly be abnormal given his clinical exam and complaints.  If positive for peripheral arterial disease we will check CT abdomen with runoff.  3. COPD -his lungs sound awful.  We will order PFTs and refer him to Dr. Malagon for pulmonology evaluation.  4. Tobacco abuse -counseled on need for tobacco cessation and abstinence from tobacco products.  He does not sound ready to quit and will continue to discuss this with myself and Dr. Malagon as well as his primary care physician.  5. Hypersomnia -long discussion about benefits of sleep apnea testing.  He will discuss this with Dr. Malagon.  6. Hypertension -seemingly well-controlled today.  Sodium restricted diet.  Twice daily blood pressure log.  7. Diabetes -defer to primary care for ongoing work-up and management.  8. Exertional dyspnea -and lower extremity edema with decreased exercise capacity.  Check echocardiogram.    Return to clinic 6 months           Discharge Medications          Accurate as of October 1, 2020  2:30 PM. If you have any questions, ask your nurse or doctor.            Continue These Medications      Instructions Start Date   aspirin tablet   81 mg, Oral      atenolol 25 MG tablet  Commonly known as:  TENORMIN   TAKE 1 TABLET BY MOUTH EVERY DAY      atorvastatin 40 MG tablet  Commonly known as: LIPITOR   TAKE 1 TABLET BY MOUTH AT BEDTIME      isosorbide mononitrate 30 MG 24 hr tablet  Commonly known as: IMDUR   30 mg, Oral, Daily, Needs appointment for further refill. Please call [phone]      lisinopril 5 MG tablet  Commonly known as: PRINIVIL,ZESTRIL   TAKE 1 TABLET BY MOUTH EVERY DAY      metFORMIN 500 MG tablet  Commonly known as: GLUCOPHAGE   500 mg, Oral, Daily, TAKE 1/2 TABLET DAILY       multivitamin capsule   Oral      SPIRIVA HANDIHALER IN   Inhalation      Turmeric Curcumin capsule   Oral             Thank you for allowing me to participate in the care of your patient,      Sincerely,   Donovan Mcneill Jr, MD  [facility]  10/01/20  14:30 EDT

## 2020-10-05 RX ORDER — LISINOPRIL 5 MG/1
TABLET ORAL
Qty: 90 TABLET | Refills: 3 | Status: SHIPPED | OUTPATIENT
Start: 2020-10-05

## 2020-10-11 DIAGNOSIS — E78.5 HYPERLIPIDEMIA LDL GOAL <70: Primary | ICD-10-CM

## 2020-10-11 DIAGNOSIS — Z51.81 ENCOUNTER FOR THERAPEUTIC DRUG LEVEL MONITORING: ICD-10-CM

## 2020-10-13 ENCOUNTER — TELEPHONE (OUTPATIENT)
Dept: CARDIOLOGY | Facility: CLINIC | Age: 60
End: 2020-10-13

## 2020-10-13 DIAGNOSIS — Z51.81 ENCOUNTER FOR THERAPEUTIC DRUG LEVEL MONITORING: ICD-10-CM

## 2020-10-13 DIAGNOSIS — E78.5 HYPERLIPIDEMIA LDL GOAL <70: Primary | ICD-10-CM

## 2020-10-13 NOTE — TELEPHONE ENCOUNTER
Patient wants to go to [facility] to have lipid panel and CMP Drawn this week. Saw BRANDON on 10/1/2020

## 2020-10-14 RX ORDER — ATORVASTATIN CALCIUM 40 MG/1
TABLET, FILM COATED ORAL
Qty: 90 TABLET | Refills: 0 | Status: SHIPPED | OUTPATIENT
Start: 2020-10-14 | End: 2020-10-15 | Stop reason: SDUPTHER

## 2020-10-15 RX ORDER — ATORVASTATIN CALCIUM 40 MG/1
40 TABLET, FILM COATED ORAL
Qty: 90 TABLET | Refills: 0 | Status: SHIPPED | OUTPATIENT
Start: 2020-10-15 | End: 2021-01-14

## 2020-12-04 ENCOUNTER — TELEPHONE (OUTPATIENT)
Dept: CARDIOLOGY | Facility: CLINIC | Age: 60
End: 2020-12-04

## 2020-12-04 NOTE — TELEPHONE ENCOUNTER
Pt called back and left message. He said that he would have his cell phone with him all day.  [phone].  I have also added this number  to his chart.

## 2020-12-04 NOTE — TELEPHONE ENCOUNTER
Spoke to patient about testing results.  Symptoms resolved.  He is going to need to see pulmonary soon.

## 2021-01-12 ENCOUNTER — TELEPHONE (OUTPATIENT)
Dept: CARDIOLOGY | Facility: CLINIC | Age: 61
End: 2021-01-12

## 2021-01-12 NOTE — TELEPHONE ENCOUNTER
Pt said he was returning a call to have blood work done for his rx refills.    [phone]    Thank you    Rae ORTIZ

## 2021-01-13 ENCOUNTER — TELEPHONE (OUTPATIENT)
Dept: CARDIOLOGY | Facility: CLINIC | Age: 61
End: 2021-01-13

## 2021-01-13 NOTE — TELEPHONE ENCOUNTER
Patient is unaware of labs needed to get medications but thinks he needed labs and does not know if they are currently available for him to have done at [facility].  We ordered these labs back in October.  We will check with [facility] system to make sure that lab orders are currently available for him to have performed.

## 2021-01-14 ENCOUNTER — TELEPHONE (OUTPATIENT)
Dept: CARDIOLOGY | Facility: CLINIC | Age: 61
End: 2021-01-14

## 2021-01-14 RX ORDER — ATORVASTATIN CALCIUM 40 MG/1
TABLET, FILM COATED ORAL
Qty: 90 TABLET | Refills: 0 | Status: SHIPPED | OUTPATIENT
Start: 2021-01-14 | End: 2021-01-27

## 2021-01-14 NOTE — TELEPHONE ENCOUNTER
I called and s/w Adriane at [facility].  She will check for the lab order and get back with us./[email or web address]

## 2021-01-14 NOTE — TELEPHONE ENCOUNTER
I spoke to him earlier today. I told him Adriane would call him regarding labs. He is a [facility] pt and see .

## 2021-01-14 NOTE — TELEPHONE ENCOUNTER
Adriane can you please call pt regarding his lab?     He LM on Dr.Adams DAVIDSON. He is a pt of ,     PT #: [phone]

## 2021-01-18 RX ORDER — METFORMIN HYDROCHLORIDE 500 MG/1
TABLET, EXTENDED RELEASE ORAL
COMMUNITY
Start: 2020-12-24 | End: 2021-04-14 | Stop reason: HOSPADM

## 2021-01-27 ENCOUNTER — TELEPHONE (OUTPATIENT)
Dept: CARDIOLOGY | Facility: CLINIC | Age: 61
End: 2021-01-27

## 2021-01-27 RX ORDER — ATORVASTATIN CALCIUM 80 MG/1
80 TABLET, FILM COATED ORAL DAILY
Qty: 30 TABLET | Refills: 11 | Status: SHIPPED | OUTPATIENT
Start: 2021-01-27 | End: 2021-11-22

## 2021-01-27 NOTE — TELEPHONE ENCOUNTER
No answer on either phone.  Left message about increasing atorvastatin to 80 mg given LDL greater than 70

## 2021-04-14 ENCOUNTER — OFFICE VISIT (OUTPATIENT)
Dept: CARDIOLOGY | Facility: CLINIC | Age: 61
End: 2021-04-14

## 2021-04-14 VITALS — WEIGHT: 270 LBS | BODY MASS INDEX: 37.8 KG/M2 | HEIGHT: 71 IN

## 2021-04-14 DIAGNOSIS — I25.10 CORONARY ARTERY DISEASE INVOLVING NATIVE CORONARY ARTERY OF NATIVE HEART WITHOUT ANGINA PECTORIS: Primary | ICD-10-CM

## 2021-04-14 PROCEDURE — 99214 OFFICE O/P EST MOD 30 MIN: CPT | Performed by: INTERNAL MEDICINE

## 2021-04-14 NOTE — PROGRESS NOTES
[facility]      Patient Name: Stephan Husain  :1960  Age: 60 y.o.  Encounter Provider:  Donovan Mcneill Jr, MD      Chief Complaint:   Chief Complaint   Patient presents with   • Follow-up     This patient has consented to a telehealth visit via telephone. The visit was scheduled as a telephone visit to comply with patient safety concerns in accordance with CDC recommendations.  All vitals recorded within this visit are reported by the patient.  I spent 30 minutes in total including but not limited to the 20 minutes spent in direct conversation with this patient.       HPI  Stephan Husain is a 60 y.o. male with past medical history of coronary artery disease status post MI in , diabetes and tobacco abuse who presents for follow-up of chronic medical illness.      Last clinic visit note: The patient previously followed with Dr. Spring but cannot be seen in [place] any longer due to insurance issues.  He has been out of work for the last year.  He notes that he performs less exercise due to posterior leg pain.  He had to stop using the push mower as when he walks more than 50 feet he has bilateral hip and calf pain.  He notes increased dyspnea on exertion and lower extremity edema.  He denies any chest pain or rest dyspnea.  He denies orthopnea but does note that he wakes up several times per night and does snore.  He continues to smoke at least 1 pack/day.  He denies alcohol or illicit drug use.  Family history is reviewed is not pertinent to this clinic.  Coronary artery disease as noted with myocardial infarction .  He also experienced non-STEMI about 5 years ago for which cardiac catheterization demonstrated 100% in-stent stenosis of previous RCA stenting with adequate collateral flow to the distal vessel and otherwise nonobstructive disease.    Echocardiogram performed since last visit showed normal left ventricular ejection fraction with no significant valvular heart  "disease and grade 1 diastolic dysfunction.  ABIs performed secondary to hip and calf pain showed mild to moderate disease.  Has been increasing exercise since then with decrease in pain.  He is walking on the treadmill as well as utilizing the stationary bike.  He is cut back on cigarette smoking.  He is increasing fruits and vegetables and lost a little bit of weight as well.  Cholesterol profile from January showed LDL of 81.  The remainder of social family history reviewed and not pertinent to this clinic visit.    The following portions of the patient's history were reviewed and updated as appropriate: allergies, current medications, past family history, past medical history, past social history, past surgical history and problem list.      Review of Systems   Constitutional: Negative for chills and fever.   HENT: Negative for hoarse voice and sore throat.    Eyes: Negative for double vision and photophobia.   Cardiovascular: Positive for claudication, dyspnea on exertion and leg swelling. Negative for chest pain, near-syncope, orthopnea, palpitations, paroxysmal nocturnal dyspnea and syncope.   Respiratory: Negative for cough and wheezing.    Skin: Negative for poor wound healing and rash.   Musculoskeletal: Negative for arthritis and joint swelling.   Gastrointestinal: Negative for bloating, abdominal pain, hematemesis and hematochezia.   Neurological: Negative for dizziness and focal weakness.   Psychiatric/Behavioral: Negative for depression and suicidal ideas.     ROS was reviewed, updated amended when necessary.    OBJECTIVE:   Vital Signs  There were no vitals filed for this visit.  Estimated body mass index is 37.66 kg/m² as calculated from the following:    Height as of this encounter: 180.3 cm (71\").    Weight as of this encounter: 122 kg (270 lb).    Vitals reviewed.         Procedures          ASSESSMENT:     Coronary artery disease status post myocardial infarction   " claudication  Hypertension  Diabetes  COPD  Tobacco abuse    PLAN OF CARE:     1. CAD -no angina.  Continue aspirin, beta-blocker and statin.  Check lipid profile.  Continue long-acting nitrate and lisinopril.  Counseled on absolute need for tobacco cessation.  2. Claudication -mild to moderate disease by ABIs.  Will continue to work on tobacco cessation as well as increased exercise and reduced weight.  Symptoms improved.  3. COPD -referred to Dr. Malagon for pulmonology evaluation.  4. Tobacco abuse -counseled on need for tobacco cessation and abstinence from tobacco products.  He does not sound ready to quit and will continue to discuss this with myself and Dr. Malagon as well as his primary care physician.  5. Hypersomnia -long discussion about benefits of sleep apnea testing.  He will discuss this with Dr. Malagon.  6. Hypertension -seemingly well-controlled today.  Sodium restricted diet.  Twice daily blood pressure log.  7. Diabetes -defer to primary care for ongoing work-up and management.  8. Exertional dyspnea -and lower extremity edema with decreased exercise capacity.  Symptoms improved with increased exercise.  9. Dyslipidemia -LDL is not quite at goal however he is improving nutritional choices and increasing exercise.  Repeat profile in 6 months.    Return to clinic 6 months           Discharge Medications          Accurate as of April 14, 2021  8:56 AM. If you have any questions, ask your nurse or doctor.            Continue These Medications      Instructions Start Date   aspirin tablet   81 mg, Oral      atenolol 25 MG tablet  Commonly known as: TENORMIN   TAKE 1 TABLET BY MOUTH EVERY DAY      atorvastatin 80 MG tablet  Commonly known as: LIPITOR   80 mg, Oral, Daily      isosorbide mononitrate 30 MG 24 hr tablet  Commonly known as: IMDUR   30 mg, Oral, Daily, Needs appointment for further refill. Please call [phone]      lisinopril 5 MG tablet  Commonly known as: PRINIVIL,ZESTRIL   TAKE 1 TABLET  BY MOUTH EVERY DAY      multivitamin capsule   Oral      SPIRIVA HANDIHALER IN   Inhalation, As Needed      Turmeric Curcumin capsule   Oral      VITAMIN D3 PO   Oral, Daily         Stop These Medications    metFORMIN 500 MG tablet  Commonly known as: GLUCOPHAGE  Stopped by: Donovan Mcneill Jr, MD     metFORMIN  MG 24 hr tablet  Commonly known as: GLUCOPHAGE-XR  Stopped by: Donovan Mcneill Jr, MD            Thank you for allowing me to participate in the care of your patient,      Sincerely,   Donovan Mcneill MD  [facility]  04/14/21  08:56 EDT

## 2021-11-15 NOTE — TELEPHONE ENCOUNTER
Need labs   Dermal Autograft Text: The defect edges were debeveled with a #15 scalpel blade.  Given the location of the defect, shape of the defect and the proximity to free margins a dermal autograft was deemed most appropriate.  Using a sterile surgical marker, the primary defect shape was transferred to the donor site. The area thus outlined was incised deep to adipose tissue with a #15 scalpel blade.  The harvested graft was then trimmed of adipose and epidermal tissue until only dermis was left.  The skin graft was then placed in the primary defect and oriented appropriately.

## 2021-11-22 RX ORDER — ATORVASTATIN CALCIUM 80 MG/1
TABLET, FILM COATED ORAL
Qty: 90 TABLET | Refills: 3 | Status: SHIPPED | OUTPATIENT
Start: 2021-11-22

## 2022-07-07 ENCOUNTER — OFFICE VISIT (OUTPATIENT)
Dept: CARDIOLOGY | Facility: CLINIC | Age: 62
End: 2022-07-07

## 2022-07-07 VITALS
HEART RATE: 74 BPM | SYSTOLIC BLOOD PRESSURE: 160 MMHG | HEIGHT: 71 IN | BODY MASS INDEX: 37.24 KG/M2 | DIASTOLIC BLOOD PRESSURE: 88 MMHG | WEIGHT: 266 LBS

## 2022-07-07 DIAGNOSIS — I10 PRIMARY HYPERTENSION: ICD-10-CM

## 2022-07-07 DIAGNOSIS — I25.10 CORONARY ARTERY DISEASE INVOLVING NATIVE CORONARY ARTERY OF NATIVE HEART WITHOUT ANGINA PECTORIS: Primary | ICD-10-CM

## 2022-07-07 DIAGNOSIS — E78.5 HYPERLIPIDEMIA LDL GOAL <70: ICD-10-CM

## 2022-07-07 PROCEDURE — 99214 OFFICE O/P EST MOD 30 MIN: CPT | Performed by: INTERNAL MEDICINE

## 2022-07-07 NOTE — PROGRESS NOTES
[facility]      Patient Name: Stephan Husain  :1960  Age: 61 y.o.  Encounter Provider:  Donovan Mcneill Jr, MD      Chief Complaint:   Chief Complaint   Patient presents with   • Coronary Artery Disease     This patient has consented to a telehealth visit via telephone. The visit was scheduled as a telephone visit to comply with patient safety concerns in accordance with [facility] recommendations.  All vitals recorded within this visit are reported by the patient.  I spent 30 minutes in total including but not limited to the 20 minutes spent in direct conversation with this patient.       Coronary Artery Disease  Symptoms include leg swelling. Pertinent negatives include no chest pain, dizziness or palpitations.     Stephan Husain is a 61 y.o. male with past medical history of coronary artery disease status post MI in , diabetes and tobacco abuse who presents for follow-up of chronic medical illness.      Last clinic visit note: The patient previously followed with Dr. Spring but cannot be seen in [place] any longer due to insurance issues.  He has been out of work for the last year.  He notes that he performs less exercise due to posterior leg pain.  He had to stop using the push mower as when he walks more than 50 feet he has bilateral hip and calf pain.  He notes increased dyspnea on exertion and lower extremity edema.  He denies any chest pain or rest dyspnea.  He denies orthopnea but does note that he wakes up several times per night and does snore.  He continues to smoke at least 1 pack/day.  He denies alcohol or illicit drug use.  Family history is reviewed is not pertinent to this clinic.  Coronary artery disease as noted with myocardial infarction .  He also experienced non-STEMI about 5 years ago for which cardiac catheterization demonstrated 100% in-stent stenosis of previous RCA stenting with adequate collateral flow to the distal vessel and otherwise nonobstructive  "disease.    Continues to do well since last visit.  He has decreased weight through increased activity.  Less shortness of air with activity once starting the tiotropium inhaler.  No angina.  No orthopnea, PND or edema.  Blood pressure elevated today in clinic.  He is not checking his blood pressure at home.  He is decreasing cigarette use and is planning to quit soon.  Social and family history was reviewed and is not pertinent to this clinic visit.    The following portions of the patient's history were reviewed and updated as appropriate: allergies, current medications, past family history, past medical history, past social history, past surgical history and problem list.      Review of Systems   Constitutional: Negative for chills and fever.   HENT: Negative for hoarse voice and sore throat.    Eyes: Negative for double vision and photophobia.   Cardiovascular: Positive for claudication, dyspnea on exertion and leg swelling. Negative for chest pain, near-syncope, orthopnea, palpitations, paroxysmal nocturnal dyspnea and syncope.   Respiratory: Negative for cough and wheezing.    Skin: Negative for poor wound healing and rash.   Musculoskeletal: Negative for arthritis and joint swelling.   Gastrointestinal: Negative for bloating, abdominal pain, hematemesis and hematochezia.   Neurological: Negative for dizziness and focal weakness.   Psychiatric/Behavioral: Negative for depression and suicidal ideas.     ROS was reviewed, updated amended when necessary.    OBJECTIVE:   Vital Signs  Vitals:    07/07/22 1158   BP: 160/88   Pulse: 74     Estimated body mass index is 37.1 kg/m² as calculated from the following:    Height as of this encounter: 180.3 cm (71\").    Weight as of this encounter: 121 kg (266 lb).    Vitals reviewed.         Procedures          ASSESSMENT:     Coronary artery disease status post myocardial infarction   claudication  Hypertension  Diabetes  COPD  Tobacco abuse    PLAN OF CARE:     1. CAD -no " angina.  Continue aspirin, beta-blocker and statin.  Check lipid profile.  Continue long-acting nitrate and lisinopril.  Counseled on absolute need for tobacco cessation.  2. Claudication -mild to moderate disease by ABIs.  Will continue to work on tobacco cessation as well as increased exercise and reduced weight.  Symptoms improved.  3. COPD -referred to Dr. Malagon for pulmonology evaluation.  4. Tobacco abuse -counseled on need for tobacco cessation and abstinence from tobacco products.  He does not sound ready to quit and will continue to discuss this with myself and Dr. Malagon as well as his primary care physician.  5. Hypersomnia -long discussion about benefits of sleep apnea testing.  He will discuss this with Dr. Malagon.  6. Hypertension -elevated today in clinic.  Sodium restricted diet.  He will purchase a blood pressure cuff and check twice daily blood pressure log.  7. Diabetes -defer to primary care for ongoing work-up and management.  8. Exertional dyspnea -and lower extremity edema with decreased exercise capacity.  Symptoms improved with increased exercise.  9. Dyslipidemia -LDL is not quite at goal however he is improving nutritional choices and increasing exercise.  Repeat profile in 6 months.    Return to clinic 6 months           Discharge Medications          Accurate as of July 7, 2022 12:15 PM. If you have any questions, ask your nurse or doctor.            Continue These Medications      Instructions Start Date   aspirin tablet   81 mg, Oral      atenolol 25 MG tablet  Commonly known as: TENORMIN   TAKE 1 TABLET BY MOUTH EVERY DAY      atorvastatin 80 MG tablet  Commonly known as: LIPITOR   TAKE 1 TABLET BY MOUTH EVERY DAY      isosorbide mononitrate 30 MG 24 hr tablet  Commonly known as: IMDUR   30 mg, Oral, Daily, Needs appointment for further refill. Please call [phone]      lisinopril 5 MG tablet  Commonly known as: PRINIVIL,ZESTRIL   TAKE 1 TABLET BY MOUTH EVERY DAY      metFORMIN  1000 MG tablet  Commonly known as: GLUCOPHAGE   1,000 mg, Oral, 2 Times Daily With Meals      multivitamin capsule   Oral      SPIRIVA HANDIHALER IN   Inhalation, As Needed      Turmeric Curcumin capsule   Oral      VITAMIN D3 PO   Oral, Daily             Thank you for allowing me to participate in the care of your patient,      Sincerely,   Donovan Mcneill MD  [facility]  07/07/22  12:15 EDT

## 2023-04-05 NOTE — TELEPHONE ENCOUNTER
is out of the office and will return on 2/26/18.    Pt had his colonoscopy today and remove some polyps. He was to hold warfarin 3 days prior to surgery. The surgeon advise him to hold his warfarin for another 7 days, Is it okay to hold?     Thanks Allie   
 is out today Can you advise?    Pt is schedule for a colonoscopy on Friday. Is it okay for the pt to stop his ASA 3-5 days prior? Pt will call back once he know the surgeon. He was last seen on 6/21/17.    Pt #: [phone]    Thanks Allie  
His stent was placed in 2015.  He is ok to hold the aspirin for 3 days prior to the colonoscopy.      DANAE  
I do not see Warfarin on the med list.  Are you talking about holding aspirin?  
I'm sorry! Yes ASA another 7 days  
LM on pt VM that it is ok to hold ASA 3 days prior to the colonoscopy.    Thanks Allie   
Pt is aware that it is ok to hold ASA another 7 days  
Yes, he can hold the aspirin for another 7 days.  Thanks     DANAE  
Yes